# Patient Record
Sex: MALE | Race: WHITE | NOT HISPANIC OR LATINO
[De-identification: names, ages, dates, MRNs, and addresses within clinical notes are randomized per-mention and may not be internally consistent; named-entity substitution may affect disease eponyms.]

---

## 2017-02-27 ENCOUNTER — APPOINTMENT (OUTPATIENT)
Dept: CARDIOLOGY | Facility: CLINIC | Age: 67
End: 2017-02-27

## 2017-04-20 ENCOUNTER — APPOINTMENT (OUTPATIENT)
Dept: CARDIOLOGY | Facility: CLINIC | Age: 67
End: 2017-04-20

## 2017-04-20 ENCOUNTER — NON-APPOINTMENT (OUTPATIENT)
Age: 67
End: 2017-04-20

## 2017-04-20 VITALS
SYSTOLIC BLOOD PRESSURE: 112 MMHG | HEART RATE: 60 BPM | HEIGHT: 74 IN | BODY MASS INDEX: 32.34 KG/M2 | WEIGHT: 252 LBS | OXYGEN SATURATION: 97 % | DIASTOLIC BLOOD PRESSURE: 64 MMHG

## 2017-04-20 RX ORDER — LISINOPRIL 20 MG/1
20 TABLET ORAL DAILY
Refills: 0 | Status: ACTIVE | COMMUNITY

## 2017-05-03 ENCOUNTER — RESULT REVIEW (OUTPATIENT)
Age: 67
End: 2017-05-03

## 2017-06-07 ENCOUNTER — APPOINTMENT (OUTPATIENT)
Dept: CARDIOLOGY | Facility: CLINIC | Age: 67
End: 2017-06-07

## 2017-06-19 ENCOUNTER — APPOINTMENT (OUTPATIENT)
Dept: CARDIOLOGY | Facility: CLINIC | Age: 67
End: 2017-06-19

## 2017-06-21 LAB
ALBUMIN SERPL ELPH-MCNC: 4.7 G/DL
ALP BLD-CCNC: 63 U/L
ALT SERPL-CCNC: 24 U/L
ANION GAP SERPL CALC-SCNC: 18 MMOL/L
AST SERPL-CCNC: 21 U/L
BASOPHILS # BLD AUTO: 0.04 K/UL
BASOPHILS NFR BLD AUTO: 0.7 %
BILIRUB SERPL-MCNC: 0.7 MG/DL
BUN SERPL-MCNC: 14 MG/DL
CALCIUM SERPL-MCNC: 9.5 MG/DL
CHLORIDE SERPL-SCNC: 100 MMOL/L
CO2 SERPL-SCNC: 24 MMOL/L
CREAT SERPL-MCNC: 0.99 MG/DL
EOSINOPHIL # BLD AUTO: 0.17 K/UL
EOSINOPHIL NFR BLD AUTO: 2.9 %
GLUCOSE SERPL-MCNC: 81 MG/DL
HCT VFR BLD CALC: 47.1 %
HGB BLD-MCNC: 16.7 G/DL
IMM GRANULOCYTES NFR BLD AUTO: 0 %
LYMPHOCYTES # BLD AUTO: 1.66 K/UL
LYMPHOCYTES NFR BLD AUTO: 27.9 %
MAN DIFF?: NORMAL
MCHC RBC-ENTMCNC: 33.4 PG
MCHC RBC-ENTMCNC: 35.5 GM/DL
MCV RBC AUTO: 94.2 FL
MONOCYTES # BLD AUTO: 0.56 K/UL
MONOCYTES NFR BLD AUTO: 9.4 %
NEUTROPHILS # BLD AUTO: 3.53 K/UL
NEUTROPHILS NFR BLD AUTO: 59.1 %
PLATELET # BLD AUTO: 186 K/UL
POTASSIUM SERPL-SCNC: 5 MMOL/L
PROT SERPL-MCNC: 7.3 G/DL
RBC # BLD: 5 M/UL
RBC # FLD: 12.9 %
SODIUM SERPL-SCNC: 142 MMOL/L
WBC # FLD AUTO: 5.96 K/UL

## 2017-06-22 ENCOUNTER — APPOINTMENT (OUTPATIENT)
Dept: CARDIOLOGY | Facility: CLINIC | Age: 67
End: 2017-06-22

## 2017-06-22 ENCOUNTER — NON-APPOINTMENT (OUTPATIENT)
Age: 67
End: 2017-06-22

## 2017-06-22 VITALS
SYSTOLIC BLOOD PRESSURE: 130 MMHG | DIASTOLIC BLOOD PRESSURE: 80 MMHG | WEIGHT: 232 LBS | BODY MASS INDEX: 29.77 KG/M2 | RESPIRATION RATE: 14 BRPM | HEIGHT: 74 IN | HEART RATE: 72 BPM | TEMPERATURE: 98.7 F | OXYGEN SATURATION: 98 %

## 2017-06-22 DIAGNOSIS — R35.1 BENIGN PROSTATIC HYPERPLASIA WITH LOWER URINARY TRACT SYMPMS: ICD-10-CM

## 2017-06-22 DIAGNOSIS — N40.1 BENIGN PROSTATIC HYPERPLASIA WITH LOWER URINARY TRACT SYMPMS: ICD-10-CM

## 2017-06-23 LAB
ALBUMIN SERPL ELPH-MCNC: 4.7 G/DL
ALP BLD-CCNC: 65 U/L
ALT SERPL-CCNC: 19 U/L
AST SERPL-CCNC: 23 U/L
BILIRUB DIRECT SERPL-MCNC: 0.4 MG/DL
BILIRUB INDIRECT SERPL-MCNC: 0.6 MG/DL
BILIRUB SERPL-MCNC: 1 MG/DL
CHOLEST SERPL-MCNC: 154 MG/DL
CHOLEST/HDLC SERPL: 1.9 RATIO
HDLC SERPL-MCNC: 80 MG/DL
LDLC SERPL CALC-MCNC: 61 MG/DL
PROT SERPL-MCNC: 7.8 G/DL
TRIGL SERPL-MCNC: 63 MG/DL

## 2017-09-27 ENCOUNTER — APPOINTMENT (OUTPATIENT)
Dept: CARDIOLOGY | Facility: CLINIC | Age: 67
End: 2017-09-27

## 2018-06-28 ENCOUNTER — APPOINTMENT (OUTPATIENT)
Dept: ORTHOPEDIC SURGERY | Facility: CLINIC | Age: 68
End: 2018-06-28
Payer: MEDICARE

## 2018-06-28 VITALS
HEART RATE: 70 BPM | BODY MASS INDEX: 29 KG/M2 | WEIGHT: 226 LBS | SYSTOLIC BLOOD PRESSURE: 124 MMHG | HEIGHT: 74 IN | DIASTOLIC BLOOD PRESSURE: 81 MMHG

## 2018-06-28 DIAGNOSIS — M79.9 SOFT TISSUE DISORDER, UNSPECIFIED: ICD-10-CM

## 2018-06-28 PROCEDURE — 99203 OFFICE O/P NEW LOW 30 MIN: CPT

## 2019-12-05 ENCOUNTER — NON-APPOINTMENT (OUTPATIENT)
Age: 69
End: 2019-12-05

## 2019-12-05 ENCOUNTER — APPOINTMENT (OUTPATIENT)
Dept: CARDIOLOGY | Facility: CLINIC | Age: 69
End: 2019-12-05
Payer: MEDICARE

## 2019-12-05 VITALS
DIASTOLIC BLOOD PRESSURE: 80 MMHG | WEIGHT: 241 LBS | HEIGHT: 74 IN | BODY MASS INDEX: 30.93 KG/M2 | HEART RATE: 67 BPM | OXYGEN SATURATION: 97 % | RESPIRATION RATE: 14 BRPM | SYSTOLIC BLOOD PRESSURE: 145 MMHG

## 2019-12-05 DIAGNOSIS — I25.10 ATHEROSCLEROTIC HEART DISEASE OF NATIVE CORONARY ARTERY W/OUT ANGINA PECTORIS: ICD-10-CM

## 2019-12-05 DIAGNOSIS — I10 ESSENTIAL (PRIMARY) HYPERTENSION: ICD-10-CM

## 2019-12-05 DIAGNOSIS — E78.00 PURE HYPERCHOLESTEROLEMIA, UNSPECIFIED: ICD-10-CM

## 2019-12-05 PROCEDURE — 99214 OFFICE O/P EST MOD 30 MIN: CPT | Mod: 25

## 2019-12-05 PROCEDURE — 93000 ELECTROCARDIOGRAM COMPLETE: CPT

## 2019-12-05 NOTE — HISTORY OF PRESENT ILLNESS
[FreeTextEntry1] : 68 Y/O gentleman PMH: CAD LAD stent 2003, HTN, HLD who presents today after a 2 year absence  [facility]par \par Claims to be feeling well no CP/SOB/Palpitations/Dizziness\par \par Does exercise 5 days a week W/O limitations or symptoms \par \par Prior testing:\par Nuclear/US Aorta 2014\par SE 2017\par Echo 2015\par Labs at [facility]

## 2019-12-05 NOTE — REASON FOR VISIT
[Follow-Up - Clinic] : a clinic follow-up of [Coronary Artery Disease] : coronary artery disease [Hyperlipidemia] : hyperlipidemia [Hypertension] : hypertension [Medication Management] : Medication management [Mitral Regurgitation] : mitral regurgitation

## 2019-12-05 NOTE — DISCUSSION/SUMMARY
[FreeTextEntry1] : CAD: No active cardiac complaints continue ASA/Statin.  Stress Echo ordered \par \par HLD: Continue Zocor with LDL goal of 70 \par \par HTN: Adequate control\par \par MR: Mild by Echo will order F/U Echo\par \par Labs ordered\par \par OV 3 months

## 2019-12-13 LAB
25(OH)D3 SERPL-MCNC: 43.2 NG/ML
ALBUMIN SERPL ELPH-MCNC: 4.9 G/DL
ALP BLD-CCNC: 58 U/L
ALT SERPL-CCNC: 32 U/L
ANION GAP SERPL CALC-SCNC: 16 MMOL/L
AST SERPL-CCNC: 26 U/L
BASOPHILS # BLD AUTO: 0.06 K/UL
BASOPHILS NFR BLD AUTO: 1 %
BILIRUB SERPL-MCNC: 0.6 MG/DL
BUN SERPL-MCNC: 16 MG/DL
CALCIUM SERPL-MCNC: 10.1 MG/DL
CHLORIDE SERPL-SCNC: 102 MMOL/L
CHOLEST SERPL-MCNC: 185 MG/DL
CHOLEST/HDLC SERPL: 2.6 RATIO
CK SERPL-CCNC: 78 U/L
CO2 SERPL-SCNC: 24 MMOL/L
CREAT SERPL-MCNC: 1.08 MG/DL
EOSINOPHIL # BLD AUTO: 0.29 K/UL
EOSINOPHIL NFR BLD AUTO: 4.6 %
ESTIMATED AVERAGE GLUCOSE: 103 MG/DL
GLUCOSE SERPL-MCNC: 94 MG/DL
HBA1C MFR BLD HPLC: 5.2 %
HCT VFR BLD CALC: 51.3 %
HDLC SERPL-MCNC: 72 MG/DL
HGB BLD-MCNC: 18 G/DL
IMM GRANULOCYTES NFR BLD AUTO: 0.3 %
LDLC SERPL CALC-MCNC: 91 MG/DL
LYMPHOCYTES # BLD AUTO: 2 K/UL
LYMPHOCYTES NFR BLD AUTO: 31.8 %
MAN DIFF?: NORMAL
MCHC RBC-ENTMCNC: 33.9 PG
MCHC RBC-ENTMCNC: 35.1 GM/DL
MCV RBC AUTO: 96.6 FL
MONOCYTES # BLD AUTO: 0.6 K/UL
MONOCYTES NFR BLD AUTO: 9.6 %
NEUTROPHILS # BLD AUTO: 3.31 K/UL
NEUTROPHILS NFR BLD AUTO: 52.7 %
PLATELET # BLD AUTO: 197 K/UL
POTASSIUM SERPL-SCNC: 4.9 MMOL/L
PROT SERPL-MCNC: 7.2 G/DL
PSA SERPL-MCNC: 1.4 NG/ML
RBC # BLD: 5.31 M/UL
RBC # FLD: 12.6 %
SODIUM SERPL-SCNC: 142 MMOL/L
T3 SERPL-MCNC: 117 NG/DL
T4 SERPL-MCNC: 6.6 UG/DL
TRIGL SERPL-MCNC: 110 MG/DL
TSH SERPL-ACNC: 1.32 UIU/ML
WBC # FLD AUTO: 6.28 K/UL

## 2020-01-06 ENCOUNTER — APPOINTMENT (OUTPATIENT)
Dept: CARDIOLOGY | Facility: CLINIC | Age: 70
End: 2020-01-06
Payer: MEDICARE

## 2020-01-06 PROCEDURE — 93306 TTE W/DOPPLER COMPLETE: CPT

## 2020-01-07 ENCOUNTER — APPOINTMENT (OUTPATIENT)
Dept: CARDIOLOGY | Facility: CLINIC | Age: 70
End: 2020-01-07
Payer: MEDICARE

## 2020-01-07 PROCEDURE — 78452 HT MUSCLE IMAGE SPECT MULT: CPT

## 2020-01-07 PROCEDURE — A9500: CPT

## 2020-01-07 PROCEDURE — 93015 CV STRESS TEST SUPVJ I&R: CPT

## 2020-03-20 ENCOUNTER — APPOINTMENT (OUTPATIENT)
Dept: CARDIOLOGY | Facility: CLINIC | Age: 70
End: 2020-03-20

## 2020-11-20 ENCOUNTER — APPOINTMENT (OUTPATIENT)
Dept: CARDIOLOGY | Facility: CLINIC | Age: 70
End: 2020-11-20

## 2025-02-10 ENCOUNTER — APPOINTMENT (OUTPATIENT)
Dept: CARDIOLOGY | Facility: CLINIC | Age: 75
End: 2025-02-10
Payer: MEDICARE

## 2025-02-10 VITALS
HEIGHT: 74 IN | BODY MASS INDEX: 30.8 KG/M2 | WEIGHT: 240 LBS | HEART RATE: 68 BPM | SYSTOLIC BLOOD PRESSURE: 150 MMHG | OXYGEN SATURATION: 98 % | DIASTOLIC BLOOD PRESSURE: 90 MMHG

## 2025-02-10 DIAGNOSIS — R06.02 SHORTNESS OF BREATH: ICD-10-CM

## 2025-02-10 PROCEDURE — 93000 ELECTROCARDIOGRAM COMPLETE: CPT

## 2025-02-10 PROCEDURE — G2211 COMPLEX E/M VISIT ADD ON: CPT

## 2025-02-10 PROCEDURE — 99204 OFFICE O/P NEW MOD 45 MIN: CPT

## 2025-02-11 ENCOUNTER — NON-APPOINTMENT (OUTPATIENT)
Age: 75
End: 2025-02-11

## 2025-02-20 ENCOUNTER — APPOINTMENT (OUTPATIENT)
Dept: CARDIOLOGY | Facility: CLINIC | Age: 75
End: 2025-02-20
Payer: MEDICARE

## 2025-02-20 DIAGNOSIS — E78.00 PURE HYPERCHOLESTEROLEMIA, UNSPECIFIED: ICD-10-CM

## 2025-02-20 DIAGNOSIS — I25.10 ATHEROSCLEROTIC HEART DISEASE OF NATIVE CORONARY ARTERY W/OUT ANGINA PECTORIS: ICD-10-CM

## 2025-02-20 DIAGNOSIS — I10 ESSENTIAL (PRIMARY) HYPERTENSION: ICD-10-CM

## 2025-02-20 PROCEDURE — 93306 TTE W/DOPPLER COMPLETE: CPT

## 2025-02-28 ENCOUNTER — RESULT REVIEW (OUTPATIENT)
Age: 75
End: 2025-02-28

## 2025-02-28 ENCOUNTER — OUTPATIENT (OUTPATIENT)
Dept: OUTPATIENT SERVICES | Facility: HOSPITAL | Age: 75
LOS: 1 days | End: 2025-02-28
Payer: MEDICARE

## 2025-02-28 DIAGNOSIS — I25.10 ATHEROSCLEROTIC HEART DISEASE OF NATIVE CORONARY ARTERY WITHOUT ANGINA PECTORIS: ICD-10-CM

## 2025-02-28 PROCEDURE — 93018 CV STRESS TEST I&R ONLY: CPT

## 2025-02-28 PROCEDURE — 93016 CV STRESS TEST SUPVJ ONLY: CPT

## 2025-02-28 PROCEDURE — A9500: CPT

## 2025-02-28 PROCEDURE — 78452 HT MUSCLE IMAGE SPECT MULT: CPT

## 2025-02-28 PROCEDURE — 78452 HT MUSCLE IMAGE SPECT MULT: CPT | Mod: 26

## 2025-02-28 PROCEDURE — 93017 CV STRESS TEST TRACING ONLY: CPT

## 2025-02-28 RX ORDER — ASPIRIN 325 MG
0 TABLET ORAL
Refills: 0 | DISCHARGE

## 2025-02-28 RX ORDER — LISINOPRIL 5 MG/1
1 TABLET ORAL
Refills: 0 | DISCHARGE

## 2025-03-01 DIAGNOSIS — I25.10 ATHEROSCLEROTIC HEART DISEASE OF NATIVE CORONARY ARTERY WITHOUT ANGINA PECTORIS: ICD-10-CM

## 2025-04-30 ENCOUNTER — APPOINTMENT (OUTPATIENT)
Dept: CARDIOLOGY | Facility: CLINIC | Age: 75
End: 2025-04-30
Payer: MEDICARE

## 2025-04-30 VITALS
WEIGHT: 234 LBS | OXYGEN SATURATION: 97 % | SYSTOLIC BLOOD PRESSURE: 156 MMHG | BODY MASS INDEX: 30.03 KG/M2 | DIASTOLIC BLOOD PRESSURE: 92 MMHG | HEIGHT: 74 IN | HEART RATE: 65 BPM

## 2025-04-30 DIAGNOSIS — E78.00 PURE HYPERCHOLESTEROLEMIA, UNSPECIFIED: ICD-10-CM

## 2025-04-30 DIAGNOSIS — I25.10 ATHEROSCLEROTIC HEART DISEASE OF NATIVE CORONARY ARTERY W/OUT ANGINA PECTORIS: ICD-10-CM

## 2025-04-30 PROCEDURE — G2211 COMPLEX E/M VISIT ADD ON: CPT

## 2025-04-30 PROCEDURE — 93000 ELECTROCARDIOGRAM COMPLETE: CPT

## 2025-04-30 PROCEDURE — 99214 OFFICE O/P EST MOD 30 MIN: CPT

## 2025-05-02 RX ORDER — TIRZEPATIDE 2.5 MG/.5ML
2.5 INJECTION, SOLUTION SUBCUTANEOUS
Qty: 4 | Refills: 0 | Status: ACTIVE | COMMUNITY
Start: 2025-04-30 | End: 1900-01-01

## 2025-08-25 ENCOUNTER — NON-APPOINTMENT (OUTPATIENT)
Age: 75
End: 2025-08-25

## 2025-08-27 ENCOUNTER — APPOINTMENT (OUTPATIENT)
Dept: GASTROENTEROLOGY | Facility: CLINIC | Age: 75
End: 2025-08-27

## 2025-08-27 VITALS
HEIGHT: 74 IN | BODY MASS INDEX: 29.39 KG/M2 | SYSTOLIC BLOOD PRESSURE: 118 MMHG | DIASTOLIC BLOOD PRESSURE: 72 MMHG | WEIGHT: 229 LBS

## 2025-08-27 DIAGNOSIS — R13.10 DYSPHAGIA, UNSPECIFIED: ICD-10-CM

## 2025-08-27 DIAGNOSIS — K46.9 UNSPECIFIED ABDOMINAL HERNIA W/OUT OBSTRUCTION OR GANGRENE: ICD-10-CM

## 2025-08-28 ENCOUNTER — APPOINTMENT (OUTPATIENT)
Dept: CARDIOLOGY | Facility: CLINIC | Age: 75
End: 2025-08-28
Payer: MEDICARE

## 2025-08-28 VITALS
OXYGEN SATURATION: 97 % | HEIGHT: 74 IN | WEIGHT: 233 LBS | HEART RATE: 66 BPM | DIASTOLIC BLOOD PRESSURE: 70 MMHG | SYSTOLIC BLOOD PRESSURE: 110 MMHG | BODY MASS INDEX: 29.9 KG/M2

## 2025-08-28 DIAGNOSIS — E78.00 PURE HYPERCHOLESTEROLEMIA, UNSPECIFIED: ICD-10-CM

## 2025-08-28 DIAGNOSIS — Z01.810 ENCOUNTER FOR PREPROCEDURAL CARDIOVASCULAR EXAMINATION: ICD-10-CM

## 2025-08-28 DIAGNOSIS — I10 ESSENTIAL (PRIMARY) HYPERTENSION: ICD-10-CM

## 2025-08-28 DIAGNOSIS — R13.10 DYSPHAGIA, UNSPECIFIED: ICD-10-CM

## 2025-08-28 PROCEDURE — G2211 COMPLEX E/M VISIT ADD ON: CPT

## 2025-08-28 PROCEDURE — 99214 OFFICE O/P EST MOD 30 MIN: CPT

## 2025-08-28 PROCEDURE — 93000 ELECTROCARDIOGRAM COMPLETE: CPT

## 2025-09-03 ENCOUNTER — APPOINTMENT (OUTPATIENT)
Dept: GASTROENTEROLOGY | Facility: AMBULATORY MEDICAL SERVICES | Age: 75
End: 2025-09-03

## 2025-09-03 ENCOUNTER — RESULT REVIEW (OUTPATIENT)
Age: 75
End: 2025-09-03

## 2025-09-03 ENCOUNTER — NON-APPOINTMENT (OUTPATIENT)
Age: 75
End: 2025-09-03

## 2025-09-03 PROCEDURE — 43239 EGD BIOPSY SINGLE/MULTIPLE: CPT | Mod: 59
